# Patient Record
Sex: MALE | Race: BLACK OR AFRICAN AMERICAN | NOT HISPANIC OR LATINO | URBAN - METROPOLITAN AREA
[De-identification: names, ages, dates, MRNs, and addresses within clinical notes are randomized per-mention and may not be internally consistent; named-entity substitution may affect disease eponyms.]

---

## 2023-12-29 ENCOUNTER — EMERGENCY (EMERGENCY)
Facility: HOSPITAL | Age: 28
LOS: 1 days | Discharge: HOME | End: 2023-12-29
Attending: EMERGENCY MEDICINE | Admitting: EMERGENCY MEDICINE
Payer: SELF-PAY

## 2023-12-29 VITALS
SYSTOLIC BLOOD PRESSURE: 137 MMHG | RESPIRATION RATE: 20 BRPM | WEIGHT: 179.9 LBS | TEMPERATURE: 97 F | HEART RATE: 99 BPM | OXYGEN SATURATION: 98 % | DIASTOLIC BLOOD PRESSURE: 83 MMHG | HEIGHT: 71 IN

## 2023-12-29 DIAGNOSIS — F10.10 ALCOHOL ABUSE, UNCOMPLICATED: ICD-10-CM

## 2023-12-29 DIAGNOSIS — R11.10 VOMITING, UNSPECIFIED: ICD-10-CM

## 2023-12-29 PROCEDURE — 99284 EMERGENCY DEPT VISIT MOD MDM: CPT

## 2023-12-29 NOTE — ED ADULT TRIAGE NOTE - ESI TRIAGE ACUITY LEVEL, MLM
2

## 2023-12-29 NOTE — ED ADULT TRIAGE NOTE - CHIEF COMPLAINT QUOTE
STUART COLES. Crew reports Pt was drinking tequila and "nut crackers" and now is ams. Pt non verbal triage with vomit on clothing. Friend with pt escorted out by security and [place].

## 2023-12-30 VITALS
OXYGEN SATURATION: 99 % | HEART RATE: 100 BPM | RESPIRATION RATE: 18 BRPM | SYSTOLIC BLOOD PRESSURE: 127 MMHG | DIASTOLIC BLOOD PRESSURE: 83 MMHG

## 2023-12-30 LAB
ETHANOL SERPL-MCNC: 200 MG/DL — HIGH
ETHANOL SERPL-MCNC: 200 MG/DL — HIGH

## 2023-12-30 RX ORDER — ONDANSETRON 8 MG/1
4 TABLET, FILM COATED ORAL ONCE
Refills: 0 | Status: COMPLETED | OUTPATIENT
Start: 2023-12-30 | End: 2023-12-30

## 2023-12-30 RX ADMIN — ONDANSETRON 4 MILLIGRAM(S): 8 TABLET, FILM COATED ORAL at 07:56

## 2023-12-30 RX ADMIN — ONDANSETRON 4 MILLIGRAM(S): 8 TABLET, FILM COATED ORAL at 07:32

## 2023-12-30 NOTE — ED PROVIDER NOTE - PATIENT PORTAL LINK FT
You can access the FollowMyHealth Patient Portal offered by [facility] by registering at the following website: [email or web address]. By joining [employer]’s FollowMyHealth portal, you will also be able to view your health information using other applications (apps) compatible with our system. You can access the FollowMyHealth Patient Portal offered by [facility] by registering at the following website: [email or web address]. By joining [employer]’s FollowMyHealth portal, you will also be able to view your health information using other applications (apps) compatible with our system.

## 2023-12-30 NOTE — ED PROVIDER NOTE - OBJECTIVE STATEMENT
Patient states he had alcohol and had vomited prior to presentation.  No fall, no signs of trauma.  Brought to ED via ems

## 2023-12-30 NOTE — ED PROVIDER NOTE - CLINICAL SUMMARY MEDICAL DECISION MAKING FREE TEXT BOX
Patient states he had alcohol and had vomited prior to presentation.  No fall, no signs of trauma.  Brought to ED via ems    Blood alcohol 200.  Patient kept in ED until gait was steady, oriented to person place and time of day.

## 2023-12-30 NOTE — ED PROVIDER NOTE - PROGRESS NOTE DETAILS
Patient asleep at this time, awaiting clinical sobriety. Patient is awake, alert, clinically sober at this time.  Complaining of nausea, zofran given. Patient was endorsed to me by Dr. Germain.  Patient currently pending clinical sobriety.

## 2023-12-30 NOTE — ED ADULT NURSE NOTE - NSFALLRISKINTERV_ED_ALL_ED
Assistance OOB with selected safe patient handling equipment if applicable/Assistance with ambulation/Communicate fall risk and risk factors to all staff, patient, and family/Monitor gait and stability/Monitor for mental status changes and reorient to person, place, and time, as needed/Provide visual cue: yellow wristband, yellow gown, etc/Reinforce activity limits and safety measures with patient and family/Toileting schedule using arm’s reach rule for commode and bathroom/Use of alarms - bed, stretcher, chair and/or video monitoring/Call bell, personal items and telephone in reach/Instruct patient to call for assistance before getting out of bed/chair/stretcher/Non-slip footwear applied when patient is off stretcher/[place] to call system/Physically safe environment - no spills, clutter or unnecessary equipment/Purposeful Proactive Rounding/Room/bathroom lighting operational, light cord in reach Assistance OOB with selected safe patient handling equipment if applicable/Assistance with ambulation/Communicate fall risk and risk factors to all staff, patient, and family/Monitor gait and stability/Monitor for mental status changes and reorient to person, place, and time, as needed/Provide visual cue: yellow wristband, yellow gown, etc/Reinforce activity limits and safety measures with patient and family/Toileting schedule using arm’s reach rule for commode and bathroom/Use of alarms - bed, stretcher, chair and/or video monitoring/Call bell, personal items and telephone in reach/Instruct patient to call for assistance before getting out of bed/chair/stretcher/Non-slip footwear applied when patient is off stretcher/[place] to call system/Physically safe environment - no spills, clutter or unnecessary equipment/Purposeful Proactive Rounding/Room/bathroom lighting operational, light cord in reach

## 2023-12-30 NOTE — ED PROVIDER NOTE - NSFOLLOWUPINSTRUCTIONS_ED_ALL_ED_FT
Binge-Drinking Information, Adult  Binge-drinking means drinking a large amount of alcohol in a short time. This is usually 5 drinks for men or 4 drinks for women, on one occasion.    People who binge-drink do not always have an alcohol problem. However, binge-drinking can raise your risk of becoming dependent on alcohol. Becoming dependent on alcohol is called alcohol use disorder.    Binge drinking can:  Cause health problems, such as heart disease, liver disease, or cancer.  Cause problems between you and family members or friends.  Lead to legal and financial problems.  How can binge-drinking affect me?  Friends and family may notice signs of binge-drinking or alcohol use disorder before you do. Binge-drinking can put you at risk for serious health problems, including:  Accidental injuries, such as alcohol poisoning or falls.  Developing alcohol use disorder.  Violence, such as sexual assault.  STIs (sexually transmitted infections).  Mental health problems, such as anxiety or depression.  Liver disease.  Heart disease.  Cancer of the liver, colon, esophagus, breast, or mouth.  Binge-drinking can raise your risk of these problems:  Car accidents.  Problems with relationships and other social situations.  Legal or financial problems.  Unplanned pregnancies.  If you binge-drink while pregnant, you and your baby may be at risk for health problems, including:  Miscarriage.  Stillbirth.  Fetal alcohol spectrum disorders (FASDs).  Sudden infant death syndrome (SIDS).  What actions can I take to prevent binge-drinking?  A couple holding hands while walking.  A person sitting on the floor doing yoga.   Avoid drinking too much alcohol.  If you drink alcohol:  Limit how much you have to:  0–1 drink a day for women who are not pregnant.  0–2 drinks a day for men.  Know how much alcohol is in a drink. In the [place] one drink equals one 12 oz bottle of beer (355 mL), one 5 oz glass of wine (148 mL), or one 1½ oz glass of hard liquor (44 mL).  Encourage others around you not to binge-drink.  Become aware of situations and people who trigger your drinking behavior, and either avoid those or find a new way to deal with them. Find hobbies that you can do instead of drinking, such as exercising or outdoor activities.  Do not drink if:  You are pregnant or trying to become pregnant.  You plan to drive a vehicle.  You plan to use machinery, such as a  or power tool.  You have a health condition that alcohol can make worse.  You take medicines that are affected by alcohol, including prescription pain medicines.  You are recovering from alcohol use disorder.  Develop skills to manage your moods and emotions so you do not need to drink to cope with them. This may include using stress reduction techniques such as:  Deep breathing.  Meditation or yoga.  Exercise or playing sports.  Keeping a stress diary.  Listening to music.  What are the benefits of controlling my drinking?  Controlling your drinking or quitting drinking can make you feel better about your life. It can also:  Help you control your weight.  Make you more likely to get into good physical shape and stay fit.  Improve how your body processes vitamins and minerals.  Improve your health by lowering your blood pressure, cholesterol, triglycerides, and blood sugar (glucose).  Help you think more clearly and make better decisions.  Where to find support:  You can get support to stop binge-drinking from:  Your health care provider. They may recommend counseling if you drink too much.  The National Drug and Alcohol Treatment Referral Service: 1-575-503-HELP (8418)  Alcoholics Anonymous, Alcoholics Resource Center: 1-721.922.6952  Substance Abuse and Mental Health Services Administration: sama.gov  Where to find more information:  Centers for Disease Control and Prevention: cdc.gov  National [place] on Alcohol Abuse and Alcoholism: niaaa.nih.gov/alcohol  Contact a health care provider if:  You cannot control your drinking, or you think that your drinking might be out of your control.  You have unexpected physical problems that cause you distress, such as accidental injuries.  Get help right away if:  You have thoughts about hurting yourself or others.  Get help right away if you feel like you may hurt yourself or others, or have thoughts about taking your own life.  Call 091.  Call the National Suicide Prevention Lifeline at 1-661.952.4470 or 507. This is open 24 hours a day.  Text the Crisis Text Line at 142200.  Summary  Binge-drinking refers to drinking a large amount of alcohol in a short time. This is usually 5 drinks for men or 4 drinks for women, on one occasion.  Binge-drinking can lead to serious health problems, such as heart disease, liver disease, or cancer.  Binge-drinking raises your risk of developing alcohol dependence (alcohol use disorder) and social and relationship problems.  Talk with your health care provider about your drinking habits. They may recommend counseling if you drink too much.  This information is not intended to replace advice given to you by your health care provider. Make sure you discuss any questions you have with your health care provider.    Document Revised: 03/06/2023 Document Reviewed: 03/06/2023  Elsevier Patient Education © 2023 Elsevier Inc. Binge-Drinking Information, Adult  Binge-drinking means drinking a large amount of alcohol in a short time. This is usually 5 drinks for men or 4 drinks for women, on one occasion.    People who binge-drink do not always have an alcohol problem. However, binge-drinking can raise your risk of becoming dependent on alcohol. Becoming dependent on alcohol is called alcohol use disorder.    Binge drinking can:  Cause health problems, such as heart disease, liver disease, or cancer.  Cause problems between you and family members or friends.  Lead to legal and financial problems.  How can binge-drinking affect me?  Friends and family may notice signs of binge-drinking or alcohol use disorder before you do. Binge-drinking can put you at risk for serious health problems, including:  Accidental injuries, such as alcohol poisoning or falls.  Developing alcohol use disorder.  Violence, such as sexual assault.  STIs (sexually transmitted infections).  Mental health problems, such as anxiety or depression.  Liver disease.  Heart disease.  Cancer of the liver, colon, esophagus, breast, or mouth.  Binge-drinking can raise your risk of these problems:  Car accidents.  Problems with relationships and other social situations.  Legal or financial problems.  Unplanned pregnancies.  If you binge-drink while pregnant, you and your baby may be at risk for health problems, including:  Miscarriage.  Stillbirth.  Fetal alcohol spectrum disorders (FASDs).  Sudden infant death syndrome (SIDS).  What actions can I take to prevent binge-drinking?  A couple holding hands while walking.  A person sitting on the floor doing yoga.   Avoid drinking too much alcohol.  If you drink alcohol:  Limit how much you have to:  0–1 drink a day for women who are not pregnant.  0–2 drinks a day for men.  Know how much alcohol is in a drink. In the [place] one drink equals one 12 oz bottle of beer (355 mL), one 5 oz glass of wine (148 mL), or one 1½ oz glass of hard liquor (44 mL).  Encourage others around you not to binge-drink.  Become aware of situations and people who trigger your drinking behavior, and either avoid those or find a new way to deal with them. Find hobbies that you can do instead of drinking, such as exercising or outdoor activities.  Do not drink if:  You are pregnant or trying to become pregnant.  You plan to drive a vehicle.  You plan to use machinery, such as a  or power tool.  You have a health condition that alcohol can make worse.  You take medicines that are affected by alcohol, including prescription pain medicines.  You are recovering from alcohol use disorder.  Develop skills to manage your moods and emotions so you do not need to drink to cope with them. This may include using stress reduction techniques such as:  Deep breathing.  Meditation or yoga.  Exercise or playing sports.  Keeping a stress diary.  Listening to music.  What are the benefits of controlling my drinking?  Controlling your drinking or quitting drinking can make you feel better about your life. It can also:  Help you control your weight.  Make you more likely to get into good physical shape and stay fit.  Improve how your body processes vitamins and minerals.  Improve your health by lowering your blood pressure, cholesterol, triglycerides, and blood sugar (glucose).  Help you think more clearly and make better decisions.  Where to find support:  You can get support to stop binge-drinking from:  Your health care provider. They may recommend counseling if you drink too much.  The National Drug and Alcohol Treatment Referral Service: 9-736-218-HELP (1782)  Alcoholics Anonymous, Alcoholics Resource Center: 1-207.907.6950  Substance Abuse and Mental Health Services Administration: sama.gov  Where to find more information:  Centers for Disease Control and Prevention: cdc.gov  National [place] on Alcohol Abuse and Alcoholism: niaaa.nih.gov/alcohol  Contact a health care provider if:  You cannot control your drinking, or you think that your drinking might be out of your control.  You have unexpected physical problems that cause you distress, such as accidental injuries.  Get help right away if:  You have thoughts about hurting yourself or others.  Get help right away if you feel like you may hurt yourself or others, or have thoughts about taking your own life.  Call 371.  Call the National Suicide Prevention Lifeline at 1-736.609.8899 or 843. This is open 24 hours a day.  Text the Crisis Text Line at 726434.  Summary  Binge-drinking refers to drinking a large amount of alcohol in a short time. This is usually 5 drinks for men or 4 drinks for women, on one occasion.  Binge-drinking can lead to serious health problems, such as heart disease, liver disease, or cancer.  Binge-drinking raises your risk of developing alcohol dependence (alcohol use disorder) and social and relationship problems.  Talk with your health care provider about your drinking habits. They may recommend counseling if you drink too much.  This information is not intended to replace advice given to you by your health care provider. Make sure you discuss any questions you have with your health care provider.    Document Revised: 03/06/2023 Document Reviewed: 03/06/2023  Elsevier Patient Education © 2023 Else[employer] Inc.